# Patient Record
Sex: MALE | Race: WHITE | NOT HISPANIC OR LATINO | Employment: FULL TIME | URBAN - METROPOLITAN AREA
[De-identification: names, ages, dates, MRNs, and addresses within clinical notes are randomized per-mention and may not be internally consistent; named-entity substitution may affect disease eponyms.]

---

## 2021-06-28 ENCOUNTER — APPOINTMENT (OUTPATIENT)
Dept: RADIOLOGY | Facility: MEDICAL CENTER | Age: 21
End: 2021-06-28
Attending: EMERGENCY MEDICINE
Payer: OTHER GOVERNMENT

## 2021-06-28 ENCOUNTER — OFFICE VISIT (OUTPATIENT)
Dept: URGENT CARE | Facility: PHYSICIAN GROUP | Age: 21
End: 2021-06-28
Payer: OTHER GOVERNMENT

## 2021-06-28 ENCOUNTER — HOSPITAL ENCOUNTER (EMERGENCY)
Facility: MEDICAL CENTER | Age: 21
End: 2021-06-28
Attending: EMERGENCY MEDICINE
Payer: OTHER GOVERNMENT

## 2021-06-28 VITALS
RESPIRATION RATE: 14 BRPM | OXYGEN SATURATION: 99 % | DIASTOLIC BLOOD PRESSURE: 65 MMHG | BODY MASS INDEX: 23.74 KG/M2 | WEIGHT: 184.97 LBS | TEMPERATURE: 97.5 F | HEART RATE: 78 BPM | SYSTOLIC BLOOD PRESSURE: 115 MMHG | HEIGHT: 74 IN

## 2021-06-28 VITALS
WEIGHT: 185 LBS | SYSTOLIC BLOOD PRESSURE: 102 MMHG | HEIGHT: 74 IN | TEMPERATURE: 98.4 F | DIASTOLIC BLOOD PRESSURE: 64 MMHG | RESPIRATION RATE: 20 BRPM | OXYGEN SATURATION: 97 % | BODY MASS INDEX: 23.74 KG/M2 | HEART RATE: 69 BPM

## 2021-06-28 DIAGNOSIS — R07.9 CHEST PAIN, UNSPECIFIED TYPE: ICD-10-CM

## 2021-06-28 DIAGNOSIS — K92.0 HEMATEMESIS WITHOUT NAUSEA: ICD-10-CM

## 2021-06-28 DIAGNOSIS — K92.0 HEMATEMESIS, PRESENCE OF NAUSEA NOT SPECIFIED: ICD-10-CM

## 2021-06-28 LAB
ALBUMIN SERPL BCP-MCNC: 4.5 G/DL (ref 3.2–4.9)
ALBUMIN/GLOB SERPL: 1.7 G/DL
ALP SERPL-CCNC: 67 U/L (ref 30–99)
ALT SERPL-CCNC: 13 U/L (ref 2–50)
ANION GAP SERPL CALC-SCNC: 10 MMOL/L (ref 7–16)
AST SERPL-CCNC: 21 U/L (ref 12–45)
BASOPHILS # BLD AUTO: 0.8 % (ref 0–1.8)
BASOPHILS # BLD: 0.06 K/UL (ref 0–0.12)
BILIRUB SERPL-MCNC: 0.4 MG/DL (ref 0.1–1.5)
BUN SERPL-MCNC: 15 MG/DL (ref 8–22)
CALCIUM SERPL-MCNC: 9.1 MG/DL (ref 8.5–10.5)
CHLORIDE SERPL-SCNC: 106 MMOL/L (ref 96–112)
CO2 SERPL-SCNC: 24 MMOL/L (ref 20–33)
CREAT SERPL-MCNC: 1.04 MG/DL (ref 0.5–1.4)
EKG IMPRESSION: NORMAL
EOSINOPHIL # BLD AUTO: 0.08 K/UL (ref 0–0.51)
EOSINOPHIL NFR BLD: 1.1 % (ref 0–6.9)
ERYTHROCYTE [DISTWIDTH] IN BLOOD BY AUTOMATED COUNT: 39.8 FL (ref 35.9–50)
GLOBULIN SER CALC-MCNC: 2.6 G/DL (ref 1.9–3.5)
GLUCOSE SERPL-MCNC: 87 MG/DL (ref 65–99)
HCT VFR BLD AUTO: 45.5 % (ref 42–52)
HGB BLD-MCNC: 14.9 G/DL (ref 14–18)
IMM GRANULOCYTES # BLD AUTO: 0.02 K/UL (ref 0–0.11)
IMM GRANULOCYTES NFR BLD AUTO: 0.3 % (ref 0–0.9)
LYMPHOCYTES # BLD AUTO: 2.13 K/UL (ref 1–4.8)
LYMPHOCYTES NFR BLD: 29.9 % (ref 22–41)
MCH RBC QN AUTO: 28.4 PG (ref 27–33)
MCHC RBC AUTO-ENTMCNC: 32.7 G/DL (ref 33.7–35.3)
MCV RBC AUTO: 86.8 FL (ref 81.4–97.8)
MONOCYTES # BLD AUTO: 0.62 K/UL (ref 0–0.85)
MONOCYTES NFR BLD AUTO: 8.7 % (ref 0–13.4)
NEUTROPHILS # BLD AUTO: 4.21 K/UL (ref 1.82–7.42)
NEUTROPHILS NFR BLD: 59.2 % (ref 44–72)
NRBC # BLD AUTO: 0 K/UL
NRBC BLD-RTO: 0 /100 WBC
PLATELET # BLD AUTO: 212 K/UL (ref 164–446)
PMV BLD AUTO: 10.8 FL (ref 9–12.9)
POTASSIUM SERPL-SCNC: 4.5 MMOL/L (ref 3.6–5.5)
PROT SERPL-MCNC: 7.1 G/DL (ref 6–8.2)
RBC # BLD AUTO: 5.24 M/UL (ref 4.7–6.1)
SODIUM SERPL-SCNC: 140 MMOL/L (ref 135–145)
TROPONIN T SERPL-MCNC: <6 NG/L (ref 6–19)
WBC # BLD AUTO: 7.1 K/UL (ref 4.8–10.8)

## 2021-06-28 PROCEDURE — 96375 TX/PRO/DX INJ NEW DRUG ADDON: CPT

## 2021-06-28 PROCEDURE — 99204 OFFICE O/P NEW MOD 45 MIN: CPT | Performed by: PHYSICIAN ASSISTANT

## 2021-06-28 PROCEDURE — 700111 HCHG RX REV CODE 636 W/ 250 OVERRIDE (IP): Performed by: EMERGENCY MEDICINE

## 2021-06-28 PROCEDURE — 99284 EMERGENCY DEPT VISIT MOD MDM: CPT

## 2021-06-28 PROCEDURE — 84484 ASSAY OF TROPONIN QUANT: CPT

## 2021-06-28 PROCEDURE — 93005 ELECTROCARDIOGRAM TRACING: CPT | Performed by: EMERGENCY MEDICINE

## 2021-06-28 PROCEDURE — 93005 ELECTROCARDIOGRAM TRACING: CPT

## 2021-06-28 PROCEDURE — 36415 COLL VENOUS BLD VENIPUNCTURE: CPT

## 2021-06-28 PROCEDURE — 71045 X-RAY EXAM CHEST 1 VIEW: CPT

## 2021-06-28 PROCEDURE — 96374 THER/PROPH/DIAG INJ IV PUSH: CPT

## 2021-06-28 PROCEDURE — 80053 COMPREHEN METABOLIC PANEL: CPT

## 2021-06-28 PROCEDURE — 85025 COMPLETE CBC W/AUTO DIFF WBC: CPT

## 2021-06-28 RX ORDER — BUTALBITAL, ACETAMINOPHEN AND CAFFEINE 50; 325; 40 MG/1; MG/1; MG/1
1 TABLET ORAL EVERY 4 HOURS PRN
COMMUNITY

## 2021-06-28 RX ORDER — ONDANSETRON 4 MG/1
4 TABLET, ORALLY DISINTEGRATING ORAL EVERY 6 HOURS PRN
COMMUNITY
End: 2021-06-28

## 2021-06-28 RX ORDER — ONDANSETRON 2 MG/ML
4 INJECTION INTRAMUSCULAR; INTRAVENOUS ONCE
Status: COMPLETED | OUTPATIENT
Start: 2021-06-28 | End: 2021-06-28

## 2021-06-28 RX ORDER — OMEPRAZOLE 20 MG/1
20 TABLET, DELAYED RELEASE ORAL DAILY
Qty: 30 TABLET | Refills: 6 | Status: SHIPPED | OUTPATIENT
Start: 2021-06-28 | End: 2022-01-27

## 2021-06-28 RX ADMIN — ONDANSETRON 4 MG: 2 INJECTION INTRAMUSCULAR; INTRAVENOUS at 12:42

## 2021-06-28 RX ADMIN — FAMOTIDINE 20 MG: 10 INJECTION INTRAVENOUS at 12:41

## 2021-06-28 ASSESSMENT — ENCOUNTER SYMPTOMS
NERVOUS/ANXIOUS: 1
CONSTIPATION: 0
NAUSEA: 1
ABDOMINAL PAIN: 0
BLOOD IN STOOL: 0
VOMITING: 1
PALPITATIONS: 0
DIARRHEA: 0

## 2021-06-28 NOTE — DISCHARGE PLANNING
Received call from ERP to schedule patient with PCP appointment. CHW met pt at bedside to schedule appointment. Patient has a PCP in [place]. Patient feels confident scheduling his own appointment. CHW added his PCP information to AVS. Patient appreciated the information. CHW informed ERP.

## 2021-06-28 NOTE — ED NOTES
Pt given discharge instructions/prescription sent to pharm of choosing/ home care instructions explained, pt verbalized understanding of instructions given/pt understands the importance of follow up, pt ambulatory to [facility].

## 2021-06-28 NOTE — PROGRESS NOTES
Subjective:   Amari Villagomez is a 20 y.o. male who presents today with   Chief Complaint   Patient presents with   • Chest Pain     vomiting blood x 1 day     Chest Pain   This is a new problem. The current episode started yesterday. The onset quality is sudden. The problem occurs constantly. The pain is present in the lateral region and substernal region. The pain is moderate. The quality of the pain is described as sharp (Shooting). The pain radiates to the left arm. Associated symptoms include nausea and vomiting. Pertinent negatives include no abdominal pain or palpitations. Associated symptoms comments: Hematemesis.     Patient states he has thrown up 4 times since last night at 9 PM and each time there has been red blood in the emesis and he has not eaten anything red.  Denies any abdominal pain and no abnormal bowel movements.  Patient denies any aggravating or alleviating factors of the chest pain.  Denies any recent injury or trauma.  States it comes randomly.  Denies any heavy alcohol intake but does state he had 3 beers around noon yesterday before the emesis began.  PMH:  has a past medical history of Fracture, Headache(784.0), and Migraine. He also has no past medical history of Allergy, ASTHMA, or Diabetes.  MEDS:   Current Outpatient Medications:   •  ondansetron (ZOFRAN ODT) 4 MG TABLET DISPERSIBLE, Take 4 mg by mouth every 6 hours as needed for Nausea., Disp: , Rfl:   •  butalbital/apap/caffeine -40 mg (FIORICET) -40 MG Tab, Take 1 tablet by mouth every four hours as needed for Headache., Disp: , Rfl:   •  sumatriptan (IMITREX) 50 MG TABS, Take 50 mg by mouth Once PRN for Migraine., Disp: , Rfl:   ALLERGIES: No Known Allergies  SURGHX: No past surgical history on file.  SOCHX:  reports that he has been smoking. His smokeless tobacco use includes chew. He reports that he does not drink alcohol and does not use drugs.  FH: Patient states his mother and grandmother both have heart  "issues and states his mother goes in and out of A. fib and also has aortic stenosis.    Review of Systems   Cardiovascular: Positive for chest pain. Negative for palpitations.   Gastrointestinal: Positive for nausea and vomiting. Negative for abdominal pain, blood in stool, constipation, diarrhea and melena.   Psychiatric/Behavioral: The patient is nervous/anxious.         Objective:   /64 (BP Location: Left arm, Patient Position: Sitting, BP Cuff Size: Adult)   Pulse 69   Temp 36.9 °C (98.4 °F) (Temporal)   Resp 20   Ht 1.88 m (6' 2\")   Wt 83.9 kg (185 lb)   SpO2 97%   BMI 23.75 kg/m²   Physical Exam  Vitals and nursing note reviewed.   Constitutional:       General: He is not in acute distress.     Appearance: He is well-developed.   HENT:      Head: Normocephalic and atraumatic.      Right Ear: Hearing normal.      Left Ear: Hearing normal.      Mouth/Throat:      Mouth: Mucous membranes are moist.      Pharynx: No oropharyngeal exudate or posterior oropharyngeal erythema.   Eyes:      Pupils: Pupils are equal, round, and reactive to light.   Cardiovascular:      Rate and Rhythm: Normal rate and regular rhythm.      Heart sounds: Normal heart sounds.   Pulmonary:      Effort: Pulmonary effort is normal.   Abdominal:      General: Bowel sounds are normal. There is no distension.      Tenderness: There is no abdominal tenderness. There is no guarding.   Musculoskeletal:      Comments: Normal movement in all 4 extremities   Skin:     General: Skin is warm and dry.   Neurological:      Mental Status: He is alert.      Coordination: Coordination normal.   Psychiatric:         Mood and Affect: Mood normal.       EKG   Normal sinus rhythm rate of 56.  No acute ST wave abnormalities appreciated at this time.  No previous EKG to compare with.  Assessment/Plan:   Assessment    1. Chest pain, unspecified type  - EKG - Clinic Performed    2. Hematemesis, presence of nausea not specified    Other orders  - " ondansetron (ZOFRAN ODT) 4 MG TABLET DISPERSIBLE; Take 4 mg by mouth every 6 hours as needed for Nausea.  - butalbital/apap/caffeine -40 mg (FIORICET) -40 MG Tab; Take 1 tablet by mouth every four hours as needed for Headache.  Given hematemesis and chest pain recommend patient go to the emergency room at this time for higher level of care and evaluation.  His vital signs are stable at this time and he elects to drive by private vehicle.  Discussed with him that we cannot completely rule out cardiac etiology in urgent care setting and also needs hematemesis worked up. He agrees with further workup and will head there immediately at this time.    Please note that this dictation was created using voice recognition software. I have made every reasonable attempt to correct obvious errors, but I expect that there are errors of grammar and possibly content that I did not discover before finalizing the note.    Jama Jarvis PA-C

## 2021-06-28 NOTE — ED PROVIDER NOTES
ED Provider Note    Scribed for Genevieve Dumont M.D. by Dian Rayo. 6/28/2021  12:26 PM    Primary care provider: Pcp Unknown  Means of arrival: walk-in  History obtained from: patient  History limited by: none    CHIEF COMPLAINT  Chief Complaint   Patient presents with    Blood in Vomit     pt states throwing up birght red blood this morning and last night    Chest Pain     pt states chest pain started around 2100 last night radiating to his L arm that is sharp pain        HPI  Amari Villagomez is a 20 y.o. male who presents to the Emergency Department for hematemesis and chest pain onset last night. Patient had four episodes of vomiting bright red blood, one this morning and three last night. He also reports that the chest pain is radiating to his left arm. He denies any abdominal pain, diarrhea, constipation, or dark tarry stool. He smokes daily and drinks occasionally. He denies any drug use or history of surgeries. He has no allergies.     REVIEW OF SYSTEMS  CARDIAC: chest pain that radiates to left arm  GI: hematemesis: no diarrhea, dark tarry stool,  or abdominal pain     See history of present illness. All other systems are negative. C.    PAST MEDICAL HISTORY   has a past medical history of Fracture, Headache(784.0), and Migraine.    SURGICAL HISTORY  patient denies any surgical history    SOCIAL HISTORY  Social History     Tobacco Use    Smoking status: Current Every Day Smoker    Smokeless tobacco: Current User     Types: Chew   Vaping Use    Vaping Use: Every day    Substances: Nicotine   Substance Use Topics    Alcohol use: No    Drug use: No      Social History     Substance and Sexual Activity   Drug Use No       FAMILY HISTORY  Family History   Problem Relation Age of Onset    Non-contributory Mother     Anxiety disorder Mother     Non-contributory Father        CURRENT MEDICATIONS  Home Medications       Reviewed by Maria D Ramirez R.N. (Registered Nurse) on 06/28/21 at 1015  Med List  "Status: Complete     Medication Last Dose Status   butalbital/apap/caffeine -40 mg (FIORICET) -40 MG Tab  Active   sumatriptan (IMITREX) 50 MG TABS  Active                    ALLERGIES  No Known Allergies    PHYSICAL EXAM  VITAL SIGNS: /67   Pulse 81   Temp 36.2 °C (97.2 °F) (Temporal)   Resp 16   Ht 1.88 m (6' 2\")   Wt 83.9 kg (184 lb 15.5 oz)   SpO2 98%   BMI 23.75 kg/m²     Constitutional: Well developed, Well nourished, No acute distress, Non-toxic appearance.   HEENT: Normocephalic, Atraumatic,  external ears normal, pharynx pink,  Mucous  Membranes moist, No rhinorrhea or mucosal edema  Eyes: PERRL, EOMI, Conjunctiva normal, No discharge.   Neck: Normal range of motion, No tenderness, Supple, No stridor.   Lymphatic: No lymphadenopathy    Cardiovascular: Regular Rate and Rhythm, No murmurs,  rubs, or gallops.   Thorax & Lungs: Lungs clear to auscultation bilaterally, No respiratory distress, No wheezes, rhales or rhonchi, No chest wall tenderness.   Abdomen: Bowel sounds normal, Soft, non tender, non distended,  No pulsatile masses., no rebound guarding or peritoneal signs.   Skin: Warm, Dry, No erythema, No rash,   Back:  No CVA tenderness,  No spinal tenderness, bony crepitance, step offs, or instability.   Neurologic: Alert & oriented clear speech no focal deficits  Extremities: Equal, intact distal pulses, No cyanosis, clubbing or edema,  No tenderness.   Musculoskeletal: Good range of motion in all major joints. No tenderness to palpation or major deformities noted.     DIAGNOSTIC STUDIES / PROCEDURES    LABS  Results for orders placed or performed during the hospital encounter of 06/28/21   CBC with Differential   Result Value Ref Range    WBC 7.1 4.8 - 10.8 K/uL    RBC 5.24 4.70 - 6.10 M/uL    Hemoglobin 14.9 14.0 - 18.0 g/dL    Hematocrit 45.5 42.0 - 52.0 %    MCV 86.8 81.4 - 97.8 fL    MCH 28.4 27.0 - 33.0 pg    MCHC 32.7 (L) 33.7 - 35.3 g/dL    RDW 39.8 35.9 - 50.0 fL    " Platelet Count 212 164 - 446 K/uL    MPV 10.8 9.0 - 12.9 fL    Neutrophils-Polys 59.20 44.00 - 72.00 %    Lymphocytes 29.90 22.00 - 41.00 %    Monocytes 8.70 0.00 - 13.40 %    Eosinophils 1.10 0.00 - 6.90 %    Basophils 0.80 0.00 - 1.80 %    Immature Granulocytes 0.30 0.00 - 0.90 %    Nucleated RBC 0.00 /100 WBC    Neutrophils (Absolute) 4.21 1.82 - 7.42 K/uL    Lymphs (Absolute) 2.13 1.00 - 4.80 K/uL    Monos (Absolute) 0.62 0.00 - 0.85 K/uL    Eos (Absolute) 0.08 0.00 - 0.51 K/uL    Baso (Absolute) 0.06 0.00 - 0.12 K/uL    Immature Granulocytes (abs) 0.02 0.00 - 0.11 K/uL    NRBC (Absolute) 0.00 K/uL   Complete Metabolic Panel (CMP)   Result Value Ref Range    Sodium 140 135 - 145 mmol/L    Potassium 4.5 3.6 - 5.5 mmol/L    Chloride 106 96 - 112 mmol/L    Co2 24 20 - 33 mmol/L    Anion Gap 10.0 7.0 - 16.0    Glucose 87 65 - 99 mg/dL    Bun 15 8 - 22 mg/dL    Creatinine 1.04 0.50 - 1.40 mg/dL    Calcium 9.1 8.5 - 10.5 mg/dL    AST(SGOT) 21 12 - 45 U/L    ALT(SGPT) 13 2 - 50 U/L    Alkaline Phosphatase 67 30 - 99 U/L    Total Bilirubin 0.4 0.1 - 1.5 mg/dL    Albumin 4.5 3.2 - 4.9 g/dL    Total Protein 7.1 6.0 - 8.2 g/dL    Globulin 2.6 1.9 - 3.5 g/dL    A-G Ratio 1.7 g/dL   Troponin   Result Value Ref Range    Troponin T <6 6 - 19 ng/L   ESTIMATED GFR   Result Value Ref Range    GFR If African American >60 >60 mL/min/1.73 m 2    GFR If Non African American >60 >60 mL/min/1.73 m 2   EKG   Result Value Ref Range    Report       [facility] Emergency Dept.    Test Date:  2021  Pt Name:    MICH ADEN               Department: ER  MRN:        [id number]                      Room:  Gender:     Male                         Technician: [id number]  :        2000                   Requested By:ER TRIAGE PROTOCOL  Order #:    [id number]                    Reading MD: MARYJANE BENNETT MD    Measurements  Intervals                                Axis  Rate:       72                           P:           57  MA:         147                          QRS:        70  QRSD:       89                           T:          69  QT:         389  QTc:        425    Interpretive Statements  Sinus rhythm  No previous ECG available for comparison  Electronically Signed On 6- 12:25:43 PDT by MARYJANE BENNETT MD        All labs reviewed by me.    EKG  12 lead EKG; Interpreted by emergency department physician      RADIOLOGY  DX-CHEST-PORTABLE (1 VIEW)   Final Result      No acute cardiopulmonary abnormality.        The radiologist's interpretation of all radiological studies have been reviewed by me.    COURSE & MEDICAL DECISION MAKING  Nursing notes, VS, PMSFHx reviewed in chart.    12:26 PM Patient seen and examined at bedside. Patient will be treated with Pepcid 20 mg and zofran 4 mg. Ordered Dx-chest, CBC with diff, CMP, and troponin to evaluate his symptoms. The differential diagnoses include but are not limited to: peptic ulcer disease vs bleeding ulcer vs pancreatitis vs Jenny Rosales tear, less likely any cardiac disease.     1:16 PM - Patient reevaluated at bedside and informed that workup was unremarkable. He was advised to avoid alcohol and cigarettes. He will be discharged with a prescription for Prilosec and referral to GI. Called ED  to establish PCP for the patient. Patient verbalizes understanding and agreement to this plan of care.      Heart Score is: 1    The patient will return for new or worsening symptoms and is stable at the time of discharge.    The patient is referred to a primary physician for blood pressure management, diabetic screening, and for all other preventative health concerns.    DISPOSITION:  Patient will be discharged home in stable condition.    FOLLOW UP:  [facility]  [address] [place] [id number] [phone]  Call in 1 day  to establish care, for aliza Hines M.D.  [address] [place] [id number]  [phone]    Schedule  an appointment as soon as possible for a visit  Please schedule an appointment with your Primary Care Provider. Thank you !       OUTPATIENT MEDICATIONS:  Discharge Medication List as of 6/28/2021  1:36 PM        START taking these medications    Details   omeprazole (PRILOSEC OTC) 20 MG tablet Take 1 tablet by mouth every day., Disp-30 tablet, R-6, Normal              FINAL IMPRESSION  1. Hematemesis without nausea    2. Chest pain, unspecified type          I, Dian Rayo (Juvenal), am scribing for, and in the presence of, Genevieve Dumont M.D..    Electronically signed by: Dian Rayo (Juvenal), 6/28/2021    I, Genevieve Dumont M.D. personally performed the services described in this documentation, as scribed by Dian Rayo in my presence, and it is both accurate and complete.    The note accurately reflects work and decisions made by me.  Genevieve Dumont M.D.  6/28/2021  2:25 PM

## 2021-06-28 NOTE — ED TRIAGE NOTES
"Chief Complaint   Patient presents with   • Blood in Vomit     pt states throwing up birght red blood this morning and last night   • Chest Pain     pt states chest pain started around 2100 last night radiating to his L arm that is sharp pain        Pt walk in for above. Pt went to  this morning and was sent here. Pt denies any trauma or abdominal pain. Educated pt on triage process and to notify if there is any change. EKG obtained    /67   Pulse 81   Temp 36.2 °C (97.2 °F) (Temporal)   Resp 16   Ht 1.88 m (6' 2\")   Wt 83.9 kg (184 lb 15.5 oz)   SpO2 98%   BMI 23.75 kg/m²     "

## 2021-11-10 ENCOUNTER — HOSPITAL ENCOUNTER (EMERGENCY)
Facility: MEDICAL CENTER | Age: 21
End: 2021-11-10
Attending: EMERGENCY MEDICINE
Payer: OTHER GOVERNMENT

## 2021-11-10 VITALS
DIASTOLIC BLOOD PRESSURE: 72 MMHG | WEIGHT: 187.61 LBS | SYSTOLIC BLOOD PRESSURE: 117 MMHG | OXYGEN SATURATION: 98 % | HEIGHT: 74 IN | HEART RATE: 94 BPM | RESPIRATION RATE: 16 BRPM | TEMPERATURE: 99 F | BODY MASS INDEX: 24.08 KG/M2

## 2021-11-10 DIAGNOSIS — M27.3 DRY TOOTH SOCKET: ICD-10-CM

## 2021-11-10 PROCEDURE — 99282 EMERGENCY DEPT VISIT SF MDM: CPT

## 2021-11-10 ASSESSMENT — FIBROSIS 4 INDEX: FIB4 SCORE: 0.55

## 2021-11-11 NOTE — DISCHARGE INSTRUCTIONS
You were seen in the Emergency Department for dental issue.    Please use 1,000mg of tylenol or 600mg of ibuprofen every 6 hours as needed for pain.  Apply saline or water soaked gauze to the area and change every few hours.  Continue antibiotics as directed.    Please call for immediate follow-up with your oral surgeon.    Return to the Emergency Department with fevers, uncontrolled pain, facial swelling, or other concerns.

## 2021-11-11 NOTE — ED PROVIDER NOTES
"ED Provider Note    CHIEF COMPLAINT  No chief complaint on file.      HPI  Amari Villagomez is a 20 y.o. male who presents with dental pain.  The patient had all 4 wisdom teeth extracted 8 days ago.  He states he was brushing his teeth aggressively tonight and is concerned that he popped out one of the sutures of the left lower tooth.  He has had increasing pain since that time.  The patient is currently on amoxicillin and has a prescription for the next 3 days.  No fevers, vomiting.  The patient does smoke cigarettes and has resumed smoking following his surgery.    REVIEW OF SYSTEMS  See HPI for further details.   Positive for dental pain  Negative for fevers, vomiting    PAST MEDICAL HISTORY   has a past medical history of Fracture, Headache(784.0), and Migraine.    SOCIAL HISTORY  Social History     Tobacco Use   • Smoking status: Current Every Day Smoker   • Smokeless tobacco: Current User     Types: Chew   Vaping Use   • Vaping Use: Every day   • Substances: Nicotine   Substance and Sexual Activity   • Alcohol use: No   • Drug use: No   • Sexual activity: Not on file       SURGICAL HISTORY  patient denies any surgical history    CURRENT MEDICATIONS  Home Medications     Reviewed by Bing Hernández R.N. (Registered Nurse) on 11/10/21 at 2135  Med List Status: Not Addressed   Medication Last Dose Status   butalbital/apap/caffeine -40 mg (FIORICET) -40 MG Tab  Active   omeprazole (PRILOSEC OTC) 20 MG tablet  Active   sumatriptan (IMITREX) 50 MG TABS  Active                ALLERGIES  No Known Allergies    PHYSICAL EXAM  VITAL SIGNS: /72   Pulse 94   Temp 37.2 °C (99 °F) (Temporal)   Resp 16   Ht 1.88 m (6' 2\")   Wt 85.1 kg (187 lb 9.8 oz)   SpO2 98%   BMI 24.09 kg/m²    Constitutional: Nontoxic appearing young male.  Alert in no apparent distress.  HENT: Normocephalic, Atraumatic. Bilateral external ears normal. Nose normal. Moist mucous membranes.  Open sockets noted in all 4 " "areas.  No surrounding alveolar fullness, trismus, submandibular fullness.  Neck: Supple, full range of motion.  Eyes: Pupils are equal and reactive. Conjunctiva normal.   Skin: Warm, Dry. No rash.   Musculoskeletal: Atraumatic, no deformities noted.   Neurologic: Alert and oriented. Moving all extremities spontaneously  Psychiatric: Affect normal, Mood normal. Appears appropriate and not intoxicated.       DIAGNOSTIC STUDIES        ED COURSE  Vitals:    11/10/21 2132 11/10/21 2134 11/10/21 2209   BP: 134/83  117/72   Pulse: 67  94   Resp: 18  16   Temp: 36.3 °C (97.3 °F)  37.2 °C (99 °F)   TempSrc: Temporal  Temporal   SpO2:  99% 98%   Weight: 85.1 kg (187 lb 9.8 oz)     Height: 1.88 m (6' 2\")           Medications administered:  Medications - No data to display          MEDICAL DECISION MAKING  Patient presents 1 week following wisdom teeth extraction with increasing pain on the left lower side and concern for sutures that \"fell out\".  He is afebrile with normal vital signs on arrival.  His exam is not concerning for infectious process such as abscess or Steve's angina.  He does have evidence of open sockets that may be causing him pain.  Will discharge home with instructions on continuation of antibiotics and pain medication in addition to wet gauze to the area.  Counseled on tobacco/smoking use.  He was instructed to call his oral surgeon tomorrow for immediate follow-up.      IMPRESSION  (M27.3) Dry tooth socket    Disposition: Discharge home, stable condition  Results, diagnoses, and treatment options were discussed with the patient and/or family. Patient verbalized understanding of plan of care and strict return precautions prior to discharge.    Patient referred to primary care provider for monitoring and treatment of blood pressure.      Discharge Medication List as of 11/10/2021 10:11 PM            Electronically signed by: Alla Wallace M.D., 11/10/2021 9:58 PM          "

## 2021-11-11 NOTE — ED NOTES
Pt cleared for d/c  dischg instructions given to pt  Verbally understand  D/c'ed to home in NAD  Aware to f/u w/ PCP tomorrow morning as planned

## 2022-01-27 ENCOUNTER — OFFICE VISIT (OUTPATIENT)
Dept: URGENT CARE | Facility: CLINIC | Age: 22
End: 2022-01-27

## 2022-01-27 VITALS
SYSTOLIC BLOOD PRESSURE: 102 MMHG | HEIGHT: 74 IN | DIASTOLIC BLOOD PRESSURE: 66 MMHG | OXYGEN SATURATION: 99 % | WEIGHT: 192 LBS | BODY MASS INDEX: 24.64 KG/M2 | RESPIRATION RATE: 18 BRPM | TEMPERATURE: 99.4 F | HEART RATE: 100 BPM

## 2022-01-27 DIAGNOSIS — A54.9 GONORRHEA: ICD-10-CM

## 2022-01-27 PROCEDURE — 99213 OFFICE O/P EST LOW 20 MIN: CPT | Performed by: FAMILY MEDICINE

## 2022-01-27 ASSESSMENT — FIBROSIS 4 INDEX: FIB4 SCORE: 0.58

## 2022-01-27 NOTE — PROGRESS NOTES
"  Subjective:      21 y.o. male presents to urgent care for gonorrhea treatment. He was having penile discharge so he went to [facility] to get STD testing. 1/11/2022 he tested positive for gonorrhea, and negative for chlamydia. He is sexually active with one female partner, and they used condoms and she had Nexplanon implant. He has not had any prior STDs.     He denies any other questions or concerns at this time.    Current problem list, medication, and past medical/surgical history were reviewed in Epic.    ROS  See HPI     Objective:      /66   Pulse 100   Temp 37.4 °C (99.4 °F) (Temporal)   Resp 18   Ht 1.88 m (6' 2\")   Wt 87.1 kg (192 lb)   SpO2 99%   BMI 24.65 kg/m²     Physical Exam  Constitutional:       General: He is not in acute distress.     Appearance: He is not diaphoretic.   Cardiovascular:      Rate and Rhythm: Normal rate and regular rhythm.      Heart sounds: Normal heart sounds.   Pulmonary:      Effort: Pulmonary effort is normal. No respiratory distress.      Breath sounds: Normal breath sounds.   Neurological:      Mental Status: He is alert.   Psychiatric:         Mood and Affect: Affect normal.         Judgment: Judgment normal.       Assessment/Plan:     1. Gonorrhea  We discussed the need for a full STD panel, which he politely declined at this time. Patient was given injection of ceftriaxone in clinic.    - cefTRIAXone (ROCEPHIN) 500 mg, lidocaine (XYLOCAINE) 1 % 1.8 mL for IM use      Instructed to return to Urgent Care or nearest Emergency Department if symptoms fail to improve, for any change in condition, further concerns, or new concerning symptoms. Patient states understanding of the plan of care and discharge instructions.    Elda Keating M.D.   "

## 2023-02-17 ENCOUNTER — OCCUPATIONAL MEDICINE (OUTPATIENT)
Dept: URGENT CARE | Facility: CLINIC | Age: 23
End: 2023-02-17
Payer: COMMERCIAL

## 2023-02-17 VITALS
TEMPERATURE: 98.2 F | DIASTOLIC BLOOD PRESSURE: 78 MMHG | HEART RATE: 72 BPM | HEIGHT: 74 IN | OXYGEN SATURATION: 99 % | WEIGHT: 210 LBS | SYSTOLIC BLOOD PRESSURE: 112 MMHG | RESPIRATION RATE: 14 BRPM | BODY MASS INDEX: 26.95 KG/M2

## 2023-02-17 DIAGNOSIS — S61.012A LACERATION OF LEFT THUMB WITHOUT DAMAGE TO NAIL, FOREIGN BODY PRESENCE UNSPECIFIED, INITIAL ENCOUNTER: ICD-10-CM

## 2023-02-17 PROCEDURE — 90471 IMMUNIZATION ADMIN: CPT

## 2023-02-17 PROCEDURE — 90715 TDAP VACCINE 7 YRS/> IM: CPT

## 2023-02-17 PROCEDURE — 12001 RPR S/N/AX/GEN/TRNK 2.5CM/<: CPT | Mod: 29,FA

## 2023-02-17 RX ORDER — CEPHALEXIN 500 MG/1
500 CAPSULE ORAL 4 TIMES DAILY
Qty: 20 CAPSULE | Refills: 0 | Status: SHIPPED | OUTPATIENT
Start: 2023-02-17 | End: 2023-02-22

## 2023-02-17 ASSESSMENT — FIBROSIS 4 INDEX: FIB4 SCORE: 0.6

## 2023-02-17 NOTE — LETTER
[facility]  [address] [place] [address]  Phone:  [phone] - Fax:  [phone]   [facility] Progress Report and Disability Certification  Date of Service: 2/17/2023   No Show:  No  Date / Time of Next Visit: 2/20/2023   Claim Information   Patient Name: Amari Villagomez  Claim Number:     Employer:   [employer] Date of Injury: 2/17/2023     Insurer / TPA: Satnam Donohue  ID / SSN:     Occupation: Technician  Diagnosis: The encounter diagnosis was Laceration of left thumb without damage to nail, foreign body presence unspecified, initial encounter.    Medical Information   Related to Industrial Injury? Yes    Subjective Complaints:  Patient presents for his first  visit today. 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 He states he was cutting the  with a razor blade and he accidentally cut into the left medial tip of his thumb. Denies fingernail damage. Denies weakness, altered sensation, decreased range of motion. States he washed the wound with running water. Does not recall his last tetus vaccine. Denies second job.   Objective Findings: Physical Exam  Vitals and nursing note reviewed.   Constitutional:       Appearance: Normal appearance.   HENT:      Head: Normocephalic and atraumatic.   Cardiovascular:      Rate and Rhythm: Normal rate and regular rhythm.      Pulses: Normal pulses.      Heart sounds: Normal heart sounds. No murmur heard.    No friction rub. No gallop.   Pulmonary:      Effort: Pulmonary effort is normal.      Breath sounds: Normal breath sounds.   Abdominal:      General: Bowel sounds are normal.      Palpations: Abdomen is soft.   Musculoskeletal:         General: Tenderness and signs of injury present.   Lymphadenopathy:      Cervical: No cervical adenopathy.   Skin:     General: Skin is warm and dry.       Capillary Refill: Capillary refill takes less than 2 seconds.      Findings: Laceration present.      Comments: Left medial distal thumb has 1 cm laceration without fingernail damage. There is TTP. +AROM. Strength is normal and symmetric. Sensation is intact to light and sharp touch, cap refill is less than 2 seconds, 2+ radial pulse.    Neurological:      Mental Status: He is alert and oriented to person, place, and time.    Pre-Existing Condition(s):     Assessment:   Initial Visit    Status: Additional Care Required  Permanent Disability:No    Plan: Medication    Diagnostics:      Comments:       Disability Information   Status: Released to Full Duty    From:  2/17/2023  Through: 2/20/2023 Restrictions are: Temporary   Physical Restrictions   Sitting:    Standing:    Stooping:    Bending:      Squatting:    Walking:    Climbing:    Pushing:      Pulling:    Other:    Reaching Above Shoulder (L):   Reaching Above Shoulder (R):       Reaching Below Shoulder (L):    Reaching Below Shoulder (R):      Not to exceed Weight Limits   Carrying(hrs):   Weight Limit(lb):   Lifting(hrs):   Weight  Limit(lb):     Comments: D39 and C4 completed today  Patient is to wear gloves while at work and when performing house hold chores.  No submerging of hands in water  Wash hands with unscented soap, pat dry, apply thin layer polysporin twice a day.  Allow patient to elevate extremity, apply ice packs, and use tylenol/ibuprofen per package instructions  Cephalexin ordered due to possible soil contamination  Follow up in 3 days for wound check  Suture removal in 10 to 14 days     Repetitive Actions   Hands: i.e. Fine Manipulations from Grasping:     Feet: i.e. Operating Foot Controls:     Driving / Operate Machinery:     Health Care Provider’s Original or Electronic Signature  SATNAM Mortensen Health Care Provider’s Original or Electronic Signature    Leoncio Alejo DO MPH     Clinic Name / Location: [facility] -  [facility]  [address] [place] [id number] Clinic Phone Number: Dept: [phone]   Appointment Time: 5:15 Pm Visit Start Time: 6:15 PM   Check-In Time:  4:55 Pm Visit Discharge Time:  719pm   Original-Treating Physician or Chiropractor    Page 2-Insurer/TPA    Page 3-Employer    Page 4-Employee

## 2023-02-17 NOTE — LETTER
"EMPLOYEE’S CLAIM FOR COMPENSATION/ REPORT OF INITIAL TREATMENT  FORM C-4    EMPLOYEE’S CLAIM - PROVIDE ALL INFORMATION REQUESTED   First Name  Amari Last Name  Dahlia Birthdate                    2000                Sex  male Claim Number (Insurer’s Use Only)   Home Address  [address]  Age  22 y.o. Height  1.88 m (6' 2\") Weight  95.3 kg (210 lb) 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 (if applicable)  Crawl Space Work    How did this injury or occupational disease occur? (Be specific an answer in detail. 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   Witnesses to the Accident  N/A      Nature of Injury or Occupational Disease  Laceration  Part(s) of Body Injured or Affected  Thumb (L), N/A, N/A    I certify that the above is true and correct to the best of my knowledge and that I have provided this information in order to obtain the benefits " of [place]  I hereby authorize any physician, chiropractor, surgeon, practitioner, or other person, any hospital, including [facility], any medical service organization, any insurance company, or other institution or organization to release to each other, any medical or other information, including benefits paid or payable, pertinent to this injury or disease, except information relative to diagnosis, treatment and/or counseling for AIDS, psychological conditions, alcohol or controlled substances, for which I must give specific authorization.  A Photostat of this authorization shall be as valid as the original.     Date   Place Employee’s Original or  *Electronic Signature   THIS REPORT MUST BE COMPLETED AND MAILED WITHIN 3 WORKING DAYS OF TREATMENT   Place  [facility]  Name of Facility  [facility]   Date  2/17/2023 Diagnosis and Description of Injury or Occupational Disease  (S61.012A) Laceration of left thumb without damage to nail, foreign body presence unspecified, initial encounter Is there evidence the injured employee was under the influence of alcohol and/or another controlled substance at the time of accident?  ? No ? Yes (if yes, please explain)   Hour  6:15 PM   The encounter diagnosis was Laceration of left thumb without damage to nail, foreign body presence unspecified, initial encounter. No   Treatment  Patient is to wear gloves while at work and when performing house hold chores.  No submerging of hands in water  Wash hands with unscented soap, pat dry, apply thin layer polysporin twice a day.  Allow patient to elevate extremity, apply ice packs, and use tylenol/ibuprofen per package instructions  Cephalexin ordered due to possible soil contamination  Have you advised the patient to remain off work five days or     more?    X-Ray  "Findings      ? Yes Indicate dates:   From   To      From information given by the employee, together with medical evidence, can        you directly connect this injury or occupational disease as job incurred?  Yes ? No If no, is the injured employee capable of:  ? full duty  Yes ? modified duty      Is additional medical care by a physician indicated?  Yes If Modified Duty, Specify any Limitations / Restrictions      Do you know of any previous injury or disease contributing to this condition or occupational disease?  ? Yes ? No (Explain if yes)                          No   Date  2/17/2023 Print Health Care Provider's   SATNAM Mortensen I certify the employer’s copy of  this form was mailed on:   Address  [address] Insurer’s Use Only     [place]  [address]    Provider’s Tax ID Number  [id number] Telephone  Dept: [phone]             Health Care Provider’s Original or Electronic Signature  e-SignMARGARET GILLILAND Degree (MD,DO, DC,PA-C,APRN)  APRN      * Complete and attach Release of Information (Form C-4A) when injured employee signs C-4 Form electronically  ORIGINAL - TREATING HEALTHCARE PROVIDER PAGE 2 - INSURER/TPA PAGE 3 - EMPLOYER PAGE 4 - EMPLOYEE             Form C-4 (rev.08/21)           BRIEF DESCRIPTION OF RIGHTS AND BENEFITS  (Pursuant to [place] 616[place])    Notice of Injury or Occupational Disease (Incident Report Form C-1): If an injury or occupational disease (OD) arises out of and in the course of employment, you must provide written notice to your employer as soon as practicable, but no later than 7 days after the accident or OD. Your employer shall maintain a sufficient supply of the required forms.    Claim for Compensation (Form C-4): If medical treatment is sought, the form C-4 is available at the place of initial treatment. A completed \"Claim for Compensation\" (Form C-4) must be filed within 90 days after an accident or OD. The " treating physician or chiropractor must, within 3 working days after treatment, complete and mail to the employer, the employer's insurer and third-party , the Claim for Compensation.    Medical Treatment: If you require medical treatment for your on-the-job injury or OD, you may be required to select a physician or chiropractor from a list provided by your workers’ compensation insurer, if it has contracted with an Organization for Managed Care (MCO) or Preferred Provider Organization (PPO) or providers of health care. If your employer has not entered into a contract with an MCO or PPO, you may select a physician or chiropractor from the Panel of Physicians and Chiropractors. Any medical costs related to your industrial injury or OD will be paid by your insurer.    Temporary Total Disability (TTD): If your doctor has certified that you are unable to work for a period of at least 5 consecutive days, or 5 cumulative days in a 20-day period, or places restrictions on you that your employer does not accommodate, you may be entitled to TTD compensation.    Temporary Partial Disability (TPD): If the wage you receive upon reemployment is less than the compensation for TTD to which you are entitled, the insurer may be required to pay you TPD compensation to make up the difference. TPD can only be paid for a maximum of 24 months.    Permanent Partial Disability (PPD): When your medical condition is stable and there is an indication of a PPD as a result of your injury or OD, within 30 days, your insurer must arrange for an evaluation by a rating physician or chiropractor to determine the degree of your PPD. The amount of your PPD award depends on the date of injury, the results of the PPD evaluation, your age and wage.    Permanent Total Disability (PTD): If you are medically certified by a treating physician or chiropractor as permanently and totally disabled and have been granted a PTD status by your insurer,  you are entitled to receive monthly benefits not to exceed 66 2/3% of your average monthly wage. The amount of your PTD payments is subject to reduction if you previously received a lump-sum PPD award.    Vocational Rehabilitation Services: You may be eligible for vocational rehabilitation services if you are unable to return to the job due to a permanent physical impairment or permanent restrictions as a result of your injury or occupational disease.    Transportation and Per Carmine Reimbursement: You may be eligible for travel expenses and per carmine associated with medical treatment.    Reopening: You may be able to reopen your claim if your condition worsens after claim closure.     Appeal Process: If you disagree with a written determination issued by the insurer or the insurer does not respond to your request, you may appeal to the Department of Administration, , by following the instructions contained in your determination letter. You must appeal the determination within 70 days from the date of the determination letter at [address], [address], [place], [place] [address], or [address], [address], [place] [address]. If you disagree with the  decision, you may appeal to the Department of Administration, . You must file your appeal within 30 days from the date of the  decision letter at [address], [address], [place], [place] [address], or [address], [address], [place] [address]. If you disagree with a decision of an , you may file a petition for judicial review with the District Court. You must do so within 30 days of the Appeal Officer’s decision. You may be represented by an  at your own expense or you may contact the [facility] for possible representation.    [place]  for Injured Workers ([place]IW): If you disagree with a  decision, you may request that [employer]  represent you without charge at an  Hearing. For information regarding denial of benefits, you may contact the [facility] at: [address], [address], [place] [address], [phone], or [address] [facility] [facility], [address], [place] [address], [phone]    To File a Complaint with the Division: If you wish to file a complaint with the  of the Division of Industrial Relations (DIR),  please contact the Workers’ Compensation Section, [address], [address], [place], [place] [address], telephone [phone], or [address], [address], [place], [place] [address], telephone [phone].    For assistance with Workers’ Compensation Issues: You may contact the [facility] for Consumer Health Assistance, [address], [address], [place], [place] [address], Toll Free 1-505.731.8367, Web site: http://[facility].nv.[facility]/Programs/AWILDA E-mail: [email or web address]              __________________________________________________________________                                    _________________            Employee Name / Signature                                                                                                                            Date                                                                                                                                                                                                                              D-2 (rev. 10/20)

## 2023-02-18 NOTE — PROGRESS NOTES
"Subjective:     Amari Villagomez is a 22 y.o. male who presents for Laceration (DOI 2/17/23, L hand, thumb, cut with razor blade )      Patient presents for his first WC visit today. 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 He states he was cutting the  with a razor blade and he accidentally cut into the left medial tip of his thumb. Denies fingernail damage. Denies weakness, altered sensation, decreased range of motion. States he washed the wound with running water. Does not recall his last tetus vaccine. Denies second job.    PMH:   No pertinent past medical history to this problem  MEDS:  Medications were reviewed in EMR  ALLERGIES:  Allergies were reviewed in EMR  SOCHX:  Works as a   FH:   No pertinent family history to this problem       Objective:     /78 (BP Location: Left arm, Patient Position: Sitting, BP Cuff Size: Adult)   Pulse 72   Temp 36.8 °C (98.2 °F) (Temporal)   Resp 14   Ht 1.88 m (6' 2\")   Wt 95.3 kg (210 lb)   SpO2 99%   BMI 26.96 kg/m²     Physical Exam  Vitals and nursing note reviewed.   Constitutional:       Appearance: Normal appearance.   HENT:      Head: Normocephalic and atraumatic.   Cardiovascular:      Rate and Rhythm: Normal rate and regular rhythm.      Pulses: Normal pulses.      Heart sounds: Normal heart sounds. No murmur heard.    No friction rub. No gallop.   Pulmonary:      Effort: Pulmonary effort is normal.      Breath sounds: Normal breath sounds.   Abdominal:      General: Bowel sounds are normal.      Palpations: Abdomen is soft.   Musculoskeletal:         General: Tenderness and signs of injury present.   Lymphadenopathy:      Cervical: No cervical adenopathy.   Skin:     General: Skin is warm and dry.      Capillary Refill: Capillary refill takes less than 2 seconds.      Findings: Laceration present.      Comments: Left medial distal thumb " has 1 cm laceration without fingernail damage. There is TTP. +AROM. Strength is normal and symmetric. Sensation is intact to light and sharp touch, cap refill is less than 2 seconds, 2+ radial pulse.    Neurological:      Mental Status: He is alert and oriented to person, place, and time.     Assessment/Plan:       1. Laceration of left thumb without damage to nail, foreign body presence unspecified, initial encounter  - Tdap =>6yo IM  - Laceration Repair  - cephALEXin (KEFLEX) 500 MG Cap; Take 1 Capsule by mouth 4 times a day for 5 days.  Dispense: 20 Capsule; Refill: 0    Released to Full Duty FROM 2/17/2023 TO 2/20/2023  D39 and C4 completed today  Patient is to wear gloves while at work and when performing house hold chores.  No submerging of hands in water  Wash hands with unscented soap, pat dry, apply thin layer polysporin twice a day.  Allow patient to elevate extremity, apply ice packs, and use tylenol/ibuprofen per package instructions  Cephalexin ordered due to possible soil contamination  Follow up in 3 days for wound check  Suture removal in 10 to 14 days      Procedure: Laceration Repair  -Risks including bleeding, nerve damage, infection, and poor cosmetic outcome discussed. Benefits and alternatives discussed.   -Clean technique with sterile instruments and suture used  -Local anesthesia with 2% lidocaine (3 cc total)  -Closed with #5-0 Prolene interrupted sutures and with good wound approximation. The laceration was closed with medical grade glue near the distal edge of the fingernail. Hemostasis achieved.  -2 sutures total  -Polysporin and dressing placed  -Patient tolerated well       Differential diagnosis, natural history, supportive care, and indications for immediate follow-up discussed.

## 2023-02-18 NOTE — PROGRESS NOTES
"Subjective:   Amari Villagomez is a 22 y.o. male who presents for Laceration (DOI 2/17/23, L hand, thumb, cut with razor blade )      HPI:    Patient presents to urgent care with complaints of   Onset was   Mild improvement with  Associated symptoms  +/- fever, chills, night sweats, nausea, vomiting, diarrhea  Contact with sick individuals      ROS As above in HPI    Medications:    Current Outpatient Medications on File Prior to Visit   Medication Sig Dispense Refill    sumatriptan (IMITREX) 50 MG TABS Take 50 mg by mouth Once PRN for Migraine.      butalbital/apap/caffeine -40 mg (FIORICET) -40 MG Tab Take 1 tablet by mouth every four hours as needed for Headache. (Patient not taking: Reported on 2/17/2023)       No current facility-administered medications on file prior to visit.        Allergies:   Patient has no known allergies.    Problem List:   There is no problem list on file for this patient.       Surgical History:  No past surgical history on file.    Past Social Hx:   Social History     Tobacco Use    Smoking status: Every Day    Smokeless tobacco: Current     Types: Chew   Vaping Use    Vaping Use: Every day    Substances: Nicotine   Substance Use Topics    Alcohol use: Yes     Alcohol/week: 1.8 oz     Types: 3 Cans of beer per week    Drug use: Never          Problem list, medications, and allergies reviewed by myself today in Epic.     Objective:     /78 (BP Location: Left arm, Patient Position: Sitting, BP Cuff Size: Adult)   Pulse 72   Temp 36.8 °C (98.2 °F) (Temporal)   Resp 14   Ht 1.88 m (6' 2\")   Wt 95.3 kg (210 lb)   SpO2 99%   BMI 26.96 kg/m²     Physical Exam    Assessment/Plan:       Results for orders placed or performed during the hospital encounter of 06/28/21   CBC with Differential   Result Value Ref Range    WBC 7.1 4.8 - 10.8 K/uL    RBC 5.24 4.70 - 6.10 M/uL    Hemoglobin 14.9 14.0 - 18.0 g/dL    Hematocrit 45.5 42.0 - 52.0 %    MCV 86.8 81.4 - 97.8 fL "    MCH 28.4 27.0 - 33.0 pg    MCHC 32.7 (L) 33.7 - 35.3 g/dL    RDW 39.8 35.9 - 50.0 fL    Platelet Count 212 164 - 446 K/uL    MPV 10.8 9.0 - 12.9 fL    Neutrophils-Polys 59.20 44.00 - 72.00 %    Lymphocytes 29.90 22.00 - 41.00 %    Monocytes 8.70 0.00 - 13.40 %    Eosinophils 1.10 0.00 - 6.90 %    Basophils 0.80 0.00 - 1.80 %    Immature Granulocytes 0.30 0.00 - 0.90 %    Nucleated RBC 0.00 /100 WBC    Neutrophils (Absolute) 4.21 1.82 - 7.42 K/uL    Lymphs (Absolute) 2.13 1.00 - 4.80 K/uL    Monos (Absolute) 0.62 0.00 - 0.85 K/uL    Eos (Absolute) 0.08 0.00 - 0.51 K/uL    Baso (Absolute) 0.06 0.00 - 0.12 K/uL    Immature Granulocytes (abs) 0.02 0.00 - 0.11 K/uL    NRBC (Absolute) 0.00 K/uL   Complete Metabolic Panel (CMP)   Result Value Ref Range    Sodium 140 135 - 145 mmol/L    Potassium 4.5 3.6 - 5.5 mmol/L    Chloride 106 96 - 112 mmol/L    Co2 24 20 - 33 mmol/L    Anion Gap 10.0 7.0 - 16.0    Glucose 87 65 - 99 mg/dL    Bun 15 8 - 22 mg/dL    Creatinine 1.04 0.50 - 1.40 mg/dL    Calcium 9.1 8.5 - 10.5 mg/dL    AST(SGOT) 21 12 - 45 U/L    ALT(SGPT) 13 2 - 50 U/L    Alkaline Phosphatase 67 30 - 99 U/L    Total Bilirubin 0.4 0.1 - 1.5 mg/dL    Albumin 4.5 3.2 - 4.9 g/dL    Total Protein 7.1 6.0 - 8.2 g/dL    Globulin 2.6 1.9 - 3.5 g/dL    A-G Ratio 1.7 g/dL   Troponin   Result Value Ref Range    Troponin T <6 6 - 19 ng/L   ESTIMATED GFR   Result Value Ref Range    GFR If African American >60 >60 mL/min/1.73 m 2    GFR If Non African American >60 >60 mL/min/1.73 m 2   EKG   Result Value Ref Range    Report       [facility] Emergency Dept.    Test Date:  2021  Pt Name:    MICH ADEN               Department: ER  MRN:        [id number]                      Room:  Gender:     Male                         Technician: [id number]  :        2000                   Requested By:ER TRIAGE PROTOCOL  Order #:    [id number]                    Reading MD: MARYJANE BENNETT,  MD    Measurements  Intervals                                Axis  Rate:       72                           P:          57  MS:         147                          QRS:        70  QRSD:       89                           T:          69  QT:         389  QTc:        425    Interpretive Statements  Sinus rhythm  No previous ECG available for comparison  Electronically Signed On 6- 12:25:43 PDT by MARYJANE BENNETT MD         Diagnosis and associated orders:   There are no diagnoses linked to this encounter.      Comments/MDM:     ***       Pt is clinically stable at today's acute urgent care visit.  No acute distress noted. Appropriate for outpatient management at this time.       Discussed DDx, management options (risks,benefits, and alternatives to planned treatment), natural progression and supportive care.  Expressed understanding and the treatment plan was agreed upon. Questions were encouraged and answered   Return to urgent care prn if new or worsening sx or if there is no improvement in condition prn.    Educated in Red flags and indications to immediately call 911 or present to the Emergency Department.   Advised the patient to follow-up with the primary care physician for recheck, reevaluation, and consideration of further management.      Please note that this dictation was created using voice recognition software. I have made a reasonable attempt to correct obvious errors, but I expect that there are errors of grammar and possibly content that I did not discover before finalizing the note.    This note was electronically signed by Casandra Arce DNP

## 2023-02-20 ENCOUNTER — OCCUPATIONAL MEDICINE (OUTPATIENT)
Dept: URGENT CARE | Facility: CLINIC | Age: 23
End: 2023-02-20
Payer: COMMERCIAL

## 2023-02-20 VITALS
OXYGEN SATURATION: 96 % | WEIGHT: 210 LBS | DIASTOLIC BLOOD PRESSURE: 80 MMHG | BODY MASS INDEX: 26.95 KG/M2 | RESPIRATION RATE: 18 BRPM | SYSTOLIC BLOOD PRESSURE: 114 MMHG | HEART RATE: 100 BPM | HEIGHT: 74 IN | TEMPERATURE: 98.5 F

## 2023-02-20 DIAGNOSIS — S61.012D LACERATION OF LEFT THUMB WITHOUT FOREIGN BODY WITHOUT DAMAGE TO NAIL, SUBSEQUENT ENCOUNTER: ICD-10-CM
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
## 2023-02-20 PROCEDURE — 99213 OFFICE O/P EST LOW 20 MIN: CPT | Performed by: STUDENT IN AN ORGANIZED HEALTH CARE EDUCATION/TRAINING PROGRAM

## 2023-02-20 ASSESSMENT — FIBROSIS 4 INDEX: FIB4 SCORE: 0.6

## 2023-02-20 NOTE — PROGRESS NOTES
"Subjective:     Amari Villagomez is a 22 y.o. male who presents for Follow-Up (WC f/u )      Patient presents for his first  visit today. 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 He states he was cutting the  with a razor blade and he accidentally cut into the left medial tip of his thumb. Denies fingernail damage. Denies weakness, altered sensation, decreased range of motion. States he washed the wound with running water. Does not recall his last tetus vaccine. Denies second job.    Today's date: 2/20/2023.  Visit #2  Patient here to follow-up with laceration.  Says his thumb throbs at night but overall experiencing minimal pain.  No drainage or discharge from the wound.  No additional complaints or concerns.    PMH:   No pertinent past medical history to this problem  MEDS:  Medications were reviewed in EMR  ALLERGIES:  Allergies were reviewed in EMR  FH:   No pertinent family history to this problem       Objective:     /80   Pulse 100   Temp 36.9 °C (98.5 °F) (Temporal)   Resp 18   Ht 1.88 m (6' 2\")   Wt 95.3 kg (210 lb)   SpO2 96%   BMI 26.96 kg/m²     Gen: no acute distress, normal voice  Skin: dry, intact, moist mucosal membranes  Head: Atraumatic, normocephalic  Psych: normal affect, normal judgement, alert, awake  Musculoskeletal: Left thumb: Laceration in long axis along the distal phalanx adjacent to the nail plate but not extending the nailbed.  No surrounding erythema or edema.  Sutures firmly in place.        Assessment/Plan:       1. Laceration of left thumb without foreign body without damage to nail, subsequent encounter    2. Work related injury    Released to Full Duty FROM 2/20/2023 TO 2/27/2023  Laceration appropriately healing.  No work restrictions.  Follow-up in 1 week for suture removal we will likely discharge at that time.  Discussed routine wound care.       Differential diagnosis, " natural history, supportive care, and indications for immediate follow-up discussed.

## 2023-02-20 NOTE — LETTER
[facility]  [address] [place] [address]  Phone:  [phone] - Fax:  [phone]   [facility] Progress Report and Disability Certification  Date of Service: 2/20/2023   No Show:  No  Date / Time of Next Visit: 2/27/2023   Claim Information   Patient Name: Amari Villagomez  Claim Number:     Employer:   [employer] Date of Injury: 2/17/2023     Insurer / TPA: Satnam Donohue  ID / SSN:     Occupation: Technician  Diagnosis: Diagnoses of Laceration of left thumb without foreign body without damage to nail, subsequent encounter and Work related injury were pertinent to this visit.    Medical Information   Related to Industrial Injury? Yes    Subjective Complaints:  Patient presents for his first WC visit today. 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 He states he was cutting the  with a razor blade and he accidentally cut into the left medial tip of his thumb. Denies fingernail damage. Denies weakness, altered sensation, decreased range of motion. States he washed the wound with running water. Does not recall his last tetus vaccine. Denies second job.    Today's date: 2/20/2023.  Visit #2  Patient here to follow-up with laceration.  Says his thumb throbs at night but overall experiencing minimal pain.  No drainage or discharge from the wound.  No additional complaints or concerns.   Objective Findings: Gen: no acute distress, normal voice  Skin: dry, intact, moist mucosal membranes  Head: Atraumatic, normocephalic  Psych: normal affect, normal judgement, alert, awake  Musculoskeletal: Left thumb: Laceration in long axis along the distal phalanx adjacent to the nail plate but not extending the nailbed.  No surrounding erythema or edema.  Sutures firmly in place.       Pre-Existing Condition(s):     Assessment:   Condition Improved     Status: Additional Care Required  Permanent Disability:No    Plan:      Diagnostics:      Comments:       Disability Information   Status: Released to Full Duty    From:  2/20/2023  Through: 2/27/2023 Restrictions are:     Physical Restrictions   Sitting:    Standing:    Stooping:    Bending:      Squatting:    Walking:    Climbing:    Pushing:      Pulling:    Other:    Reaching Above Shoulder (L):   Reaching Above Shoulder (R):       Reaching Below Shoulder (L):    Reaching Below Shoulder (R):      Not to exceed Weight Limits   Carrying(hrs):   Weight Limit(lb):   Lifting(hrs):   Weight  Limit(lb):     Comments: Laceration appropriately healing.  No work restrictions.  Follow-up in 1 week for suture removal we will likely discharge at that time.  Discussed routine wound care.    Repetitive Actions   Hands: i.e. Fine Manipulations from Grasping:     Feet: i.e. Operating Foot Controls:     Driving / Operate Machinery:     Health Care Provider’s Original or Electronic Signature  Cody Willis D.O. Health Care Provider’s Original or Electronic Signature    Leoncio Alejo DO MPH     Clinic Name / Location: [address] [address] Clinic Phone Number: Dept: [phone]   Appointment Time: 10:35 Am Visit Start Time: 10:49 AM   Check-In Time:  10:30 Am Visit Discharge Time:  1137am   Original-Treating Physician or Chiropractor    Page 2-Insurer/TPA    Page 3-Employer    Page 4-Employee

## 2023-02-27 ENCOUNTER — APPOINTMENT (OUTPATIENT)
Dept: URGENT CARE | Facility: CLINIC | Age: 23
End: 2023-02-27
Payer: COMMERCIAL

## 2023-02-28 ENCOUNTER — OCCUPATIONAL MEDICINE (OUTPATIENT)
Dept: URGENT CARE | Facility: CLINIC | Age: 23
End: 2023-02-28
Payer: COMMERCIAL

## 2023-02-28 VITALS
BODY MASS INDEX: 24.85 KG/M2 | OXYGEN SATURATION: 98 % | DIASTOLIC BLOOD PRESSURE: 72 MMHG | TEMPERATURE: 97.5 F | HEART RATE: 82 BPM | SYSTOLIC BLOOD PRESSURE: 110 MMHG | WEIGHT: 199.9 LBS | HEIGHT: 75 IN | RESPIRATION RATE: 16 BRPM

## 2023-02-28 DIAGNOSIS — S61.012D LACERATION OF LEFT THUMB WITHOUT FOREIGN BODY WITHOUT DAMAGE TO NAIL, SUBSEQUENT ENCOUNTER: ICD-10-CM

## 2023-02-28 DIAGNOSIS — Z48.02 VISIT FOR SUTURE REMOVAL: ICD-10-CM

## 2023-02-28 PROCEDURE — 99213 OFFICE O/P EST LOW 20 MIN: CPT | Performed by: PHYSICIAN ASSISTANT

## 2023-02-28 ASSESSMENT — ENCOUNTER SYMPTOMS
FOCAL WEAKNESS: 0
SENSORY CHANGE: 0
TINGLING: 0
CHILLS: 0
FEVER: 0

## 2023-02-28 ASSESSMENT — FIBROSIS 4 INDEX: FIB4 SCORE: 0.6

## 2023-02-28 NOTE — LETTER
[facility]  [address] [place] [address]  Phone:  [phone] - Fax:  [phone]   [facility] Progress Report and Disability Certification  Date of Service: 2/28/2023   No Show:  No  Date / Time of Next Visit: 3/7/2023   Claim Information   Patient Name: Amari Villagomez  Claim Number:     Employer:   [employer] Date of Injury: 2/17/2023     Insurer / TPA: Satnam Donohue  ID / SSN:     Occupation: Technician  Diagnosis: Diagnoses of Laceration of left thumb without foreign body without damage to nail, subsequent encounter and Visit for suture removal were pertinent to this visit.    Medical Information   Related to Industrial Injury? Yes    Subjective Complaints:  DOI: 2/17/2023. The patient is here for follow-up on his left thumb laceration that occurred at work. He had 2 sutures placed. He is here for suture removal. He has been using OTC Liquid Bandage on the wound over the stitches. He denies any worsening pain, swelling, drainage, or redness. He has been tolerating full duty at work.     Objective Findings: Vitals reviewed.  Left thumb: tip of finger with healing laceration. Liquid bandage removed  and 1 suture removed. No other sutures remained. Wound not completely together and mild dehiscence. No erythema or edema. No purulent drainage or redness.    Pre-Existing Condition(s):     Assessment:   Condition Improved    Status: Additional Care Required  Permanent Disability:No    Plan:   Comments:Dermabond to Dehisced part of wound. Wound care discussed. RTC 1 week. Anticipate Dischage MMI at that time if healing well    Diagnostics:      Comments:       Disability Information   Status: Released to Full Duty    From:  2/28/2023  Through: 3/7/2023 Restrictions are:     Physical Restrictions   Sitting:    Standing:    Stooping:    Bending:      Squatting:    Walking:    Climbing:    Pushing:      Pulling:     Other:    Reaching Above Shoulder (L):   Reaching Above Shoulder (R):       Reaching Below Shoulder (L):    Reaching Below Shoulder (R):      Not to exceed Weight Limits   Carrying(hrs):   Weight Limit(lb):   Lifting(hrs):   Weight  Limit(lb):     Comments: Keep wound clean covered and dry at work.    Repetitive Actions   Hands: i.e. Fine Manipulations from Grasping:     Feet: i.e. Operating Foot Controls:     Driving / Operate Machinery:     Health Care Provider’s Original or Electronic Signature  Leigh Trotter P.A.-C. Health Care Provider’s Original or Electronic Signature    Leoncio Alejo DO MPH     Clinic Name / Location: [address] [address] Clinic Phone Number: Dept: [phone]   Appointment Time: 8:10 Am Visit Start Time: 8:16 AM   Check-In Time:  8:06 Am Visit Discharge Time: 8:36 Am    Original-Treating Physician or Chiropractor    Page 2-Insurer/TPA    Page 3-Employer    Page 4-Employee

## 2023-02-28 NOTE — PROGRESS NOTES
"Subjective     Amari Villagomez is a 22 y.o. male who presents with Follow-Up ( follow up (L) thumb )      DOI: 2/17/2023. The patient is here for follow-up on his left thumb laceration that occurred at work. He had 2 sutures placed. He is here for suture removal. He has been using OTC Liquid Bandage on the wound over the stitches. He denies any worsening pain, swelling, drainage, or redness. He has been tolerating full duty at work.       HPI    Past Medical History:   Diagnosis Date    Fracture     Headache(784.0)     Migraine            No past surgical history on file.      Family History   Problem Relation Age of Onset    Non-contributory Mother     Anxiety disorder Mother     Non-contributory Father          Patient has no known allergies.      Medications, Allergies, and current problem list reviewed today in Epic      Review of Systems   Constitutional:  Negative for chills, fever and malaise/fatigue.   Musculoskeletal:  Negative for joint pain.   Skin:  Negative for rash.        Healing laceration left thumb    Neurological:  Negative for tingling, sensory change and focal weakness.     All other systems reviewed and are negative.              Objective     /72   Pulse 82   Temp 36.4 °C (97.5 °F) (Temporal)   Resp 16   Ht 1.892 m (6' 2.5\")   Wt 90.7 kg (199 lb 14.4 oz)   SpO2 98%   BMI 25.32 kg/m²      Physical Exam  Constitutional:       General: He is not in acute distress.     Appearance: He is not ill-appearing.   HENT:      Head: Normocephalic and atraumatic.   Eyes:      Conjunctiva/sclera: Conjunctivae normal.   Cardiovascular:      Rate and Rhythm: Normal rate and regular rhythm.   Pulmonary:      Effort: Pulmonary effort is normal. No respiratory distress.      Breath sounds: No stridor. No wheezing.   Skin:     General: Skin is warm and dry.   Neurological:      General: No focal deficit present.      Mental Status: He is alert and oriented to person, place, and time. "   Psychiatric:         Mood and Affect: Mood normal.         Behavior: Behavior normal.         Thought Content: Thought content normal.         Judgment: Judgment normal.       Vitals reviewed.  Left thumb: tip of finger with healing laceration. Liquid bandage removed  and 1 suture removed. No other sutures remained. Wound not completely together and mild dehiscence. No erythema or edema. No purulent drainage or redness.                    Assessment & Plan        1. Laceration of left thumb without foreign body without damage to nail, subsequent encounter      2. Visit for suture removal  Suture removed when liquid bandage removed. No other sutures.  Wound dehisced. Dermabond applied.  RTC 1 week. Anticipate Discharge/ MMI at that time.   Continue full duty  Keep wound clean covered and dry at work.    Differential diagnoses, Supportive care, and indications for immediate follow-up discussed with patient.   Pathogenesis of diagnosis discussed including typical length and natural progression.   Instructed to return to clinic or nearest emergency department for any change in condition, further concerns, or worsening of symptoms.        The patient demonstrated a good understanding and agreed with the treatment plan.    Leigh Trotter P.A.-C.  '

## 2023-03-07 ENCOUNTER — OCCUPATIONAL MEDICINE (OUTPATIENT)
Dept: URGENT CARE | Facility: CLINIC | Age: 23
End: 2023-03-07
Payer: COMMERCIAL

## 2023-03-07 VITALS
WEIGHT: 197 LBS | OXYGEN SATURATION: 98 % | BODY MASS INDEX: 25.28 KG/M2 | HEART RATE: 102 BPM | RESPIRATION RATE: 16 BRPM | DIASTOLIC BLOOD PRESSURE: 84 MMHG | HEIGHT: 74 IN | SYSTOLIC BLOOD PRESSURE: 118 MMHG | TEMPERATURE: 98.6 F

## 2023-03-07 DIAGNOSIS — S61.012D LACERATION OF LEFT THUMB WITHOUT FOREIGN BODY WITHOUT DAMAGE TO NAIL, SUBSEQUENT ENCOUNTER: ICD-10-CM

## 2023-03-07 PROCEDURE — 99212 OFFICE O/P EST SF 10 MIN: CPT | Performed by: PHYSICIAN ASSISTANT

## 2023-03-07 ASSESSMENT — ENCOUNTER SYMPTOMS
TINGLING: 0
CHILLS: 0
NAUSEA: 0
SENSORY CHANGE: 0
VOMITING: 0
FEVER: 0

## 2023-03-07 ASSESSMENT — FIBROSIS 4 INDEX: FIB4 SCORE: 0.6

## 2023-03-07 NOTE — LETTER
[facility]  [address] [place] [address]  Phone:  [phone] - Fax:  [phone]   [facility] Progress Report and Disability Certification  Date of Service: 3/7/2023   No Show:  No  Date / Time of Next Visit:     Claim Information   Patient Name: Amari Villagomez  Claim Number:     Employer:   [employer] Date of Injury: 2/17/2023     Insurer / TPA: Satnam Donohue  ID / SSN:     Occupation: Technician  Diagnosis: The encounter diagnosis was Laceration of left thumb without foreign body without damage to nail, subsequent encounter.    Medical Information   Related to Industrial Injury? Yes    Subjective Complaints:  DOI: 02/17/2023    Patient was initially evaluated for a laceration of his left thumb  on 2/17/2023.  Tetanus was updated at the time and he was found to have a 1 cm laceration on the left medial/distal aspect of the thumb without any nail involvement.  Laceration was repaired with 2 #5 Prolene sutures using simple interrupted technique.  He came in on 2/20 for wound check and was placed on full duty.  On 2/28/2023 he return for suture removal noting that he was tolerating full duty at work.  Noted that he has been using OTC liquid Band-Aid over top of the sutures.  During suture removal the liquid bandage was removed and then 1 suture was removed.  No additional sutures were visualized.  There was some mild wound dehiscence without any signs of infection Dermabond was applied.  Recommend that patient continue full duty.  Patient now here today for his fourth visit stating that he has been continuing to tolerate full duty at work.  No new injuries.  No focal weakness or distal numbness/tingling.     Objective Findings:   LEFT THUMB: Distal pad of left thumb exhibits scabbed over wound.  Wound not open at all.  No discharge.  No surrounding erythema.  No swelling.  No tenderness.  Distal neurovascular status intact.  Cap  refill less than 2 seconds.  Patient has full ROM of affected digit.     Pre-Existing Condition(s):     Assessment:   Condition Improved    Status: Discharged /  MMI  Permanent Disability:No    Plan:      Diagnostics:      Comments:       Disability Information   Status: Released to Full Duty    From:     Through:   Restrictions are:     Physical Restrictions   Sitting:    Standing:    Stooping:    Bending:      Squatting:    Walking:    Climbing:    Pushing:      Pulling:    Other:    Reaching Above Shoulder (L):   Reaching Above Shoulder (R):       Reaching Below Shoulder (L):    Reaching Below Shoulder (R):      Not to exceed Weight Limits   Carrying(hrs):   Weight Limit(lb):   Lifting(hrs):   Weight  Limit(lb):     Comments:      Repetitive Actions   Hands: i.e. Fine Manipulations from Grasping:     Feet: i.e. Operating Foot Controls:     Driving / Operate Machinery:     Health Care Provider’s Original or Electronic Signature  Maite Mckeon P.A.-C. Health Care Provider’s Original or Electronic Signature    Leoncio Alejo DO MPH     Clinic Name / Location: [address] [address] Clinic Phone Number: Dept: [phone]   Appointment Time: 8:30 Am Visit Start Time: 8:32 AM   Check-In Time:  8:08 Am Visit Discharge Time:  848am   Original-Treating Physician or Chiropractor    Page 2-Insurer/TPA    Page 3-Employer    Page 4-Employee

## 2023-03-07 NOTE — PROGRESS NOTES
"Subjective     Amari Villagomez is a 22 y.o. male who presents with Follow-Up (W/C follow up.)      DOI: 02/17/2023    Patient was initially evaluated for a laceration of his left thumb  on 2/17/2023.  Tetanus was updated at the time and he was found to have a 1 cm laceration on the left medial/distal aspect of the thumb without any nail involvement.  Laceration was repaired with 2 #5 Prolene sutures using simple interrupted technique.  He came in on 2/20 for wound check and was placed on full duty.  On 2/28/2023 he return for suture removal noting that he was tolerating full duty at work.  Noted that he has been using OTC liquid Band-Aid over top of the sutures.  During suture removal the liquid bandage was removed and then 1 suture was removed.  No additional sutures were visualized.  There was some mild wound dehiscence without any signs of infection Dermabond was applied.  Recommend that patient continue full duty.  Patient now here today for his fourth visit stating that he has been continuing to tolerate full duty at work.  No new injuries.  No focal weakness or distal numbness/tingling.      Review of Systems   Constitutional:  Negative for chills and fever.   Gastrointestinal:  Negative for nausea and vomiting.   Skin:         Healing laceration of left thumb   Neurological:  Negative for tingling and sensory change.         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     Appearance: He is not " diaphoretic.   HENT:      Head: Normocephalic and atraumatic.      Right Ear: External ear normal.      Left Ear: External ear normal.   Eyes:      Conjunctiva/sclera: Conjunctivae normal.      Pupils: Pupils are equal, round, and reactive to light.   Pulmonary:      Effort: Pulmonary effort is normal. No respiratory distress.   Musculoskeletal:      Cervical back: Normal range of motion.      Comments: LEFT THUMB: Distal pad of left thumb exhibits scabbed over wound.  Wound not open at all.  No discharge.  No surrounding erythema.  No swelling.  No tenderness.  Distal neurovascular status intact.  Cap refill less than 2 seconds.  Patient has full ROM of affected digit.     Skin:     Findings: No rash.   Neurological:      Mental Status: He is alert and oriented to person, place, and time.   Psychiatric:         Mood and Affect: Mood and affect normal.         Cognition and Memory: Memory normal.         Judgment: Judgment normal.       Assessment & Plan     1. Laceration of left thumb without foreign body without damage to nail, subsequent encounter  Wound appears well-healed.  No additional follow-up needed.  Patient discharged/MMI